# Patient Record
Sex: MALE | Race: WHITE | URBAN - METROPOLITAN AREA
[De-identification: names, ages, dates, MRNs, and addresses within clinical notes are randomized per-mention and may not be internally consistent; named-entity substitution may affect disease eponyms.]

---

## 2022-02-05 ENCOUNTER — HOSPITAL ENCOUNTER (EMERGENCY)
Age: 32
Discharge: HOME | End: 2022-02-05
Payer: COMMERCIAL

## 2022-02-05 VITALS
SYSTOLIC BLOOD PRESSURE: 175 MMHG | OXYGEN SATURATION: 97 % | HEART RATE: 71 BPM | HEIGHT: 76 IN | RESPIRATION RATE: 14 BRPM | BODY MASS INDEX: 34.7 KG/M2 | TEMPERATURE: 97.7 F | DIASTOLIC BLOOD PRESSURE: 88 MMHG | WEIGHT: 285 LBS

## 2022-02-05 DIAGNOSIS — S61.219A LACERATION OF MULTIPLE SITES OF RIGHT HAND AND FINGERS, INITIAL ENCOUNTER: Primary | ICD-10-CM

## 2022-02-05 DIAGNOSIS — S61.411A LACERATION OF MULTIPLE SITES OF RIGHT HAND AND FINGERS, INITIAL ENCOUNTER: Primary | ICD-10-CM

## 2022-02-05 PROCEDURE — 99283 EMERGENCY DEPT VISIT LOW MDM: CPT

## 2022-02-05 PROCEDURE — 90471 IMMUNIZATION ADMIN: CPT

## 2022-02-05 PROCEDURE — 12001 RPR S/N/AX/GEN/TRNK 2.5CM/<: CPT

## 2022-02-05 PROCEDURE — 90715 TDAP VACCINE 7 YRS/> IM: CPT

## 2022-02-05 PROCEDURE — 6360000002 HC RX W HCPCS

## 2022-02-05 RX ADMIN — TETANUS TOXOID, REDUCED DIPHTHERIA TOXOID AND ACELLULAR PERTUSSIS VACCINE, ADSORBED 0.5 ML: 5; 2.5; 8; 8; 2.5 SUSPENSION INTRAMUSCULAR at 14:54

## 2022-02-05 ASSESSMENT — PAIN DESCRIPTION - ORIENTATION: ORIENTATION: RIGHT

## 2022-02-05 ASSESSMENT — PAIN DESCRIPTION - LOCATION: LOCATION: FINGER (COMMENT WHICH ONE)

## 2022-02-05 ASSESSMENT — PAIN DESCRIPTION - PAIN TYPE: TYPE: ACUTE PAIN

## 2022-02-05 ASSESSMENT — PAIN SCALES - GENERAL: PAINLEVEL_OUTOF10: 3

## 2022-02-05 NOTE — ED NOTES
Patient requested dressing for stitched lacerations. Non adherent dressing placed on lacerations and secured with Kerlix.       Iza Paz RN  02/05/22 8245

## 2022-02-05 NOTE — ED PROVIDER NOTES
[facility]  ED  EMERGENCY DEPARTMENT ENCOUNTER        Pt Name: Jim Zuñiga  MRN: [id number]  Armstrongfjeffery 1990  Date of evaluation: 2/5/2022  Provider: ELLIE Juarez  PCP: JACKIE Price CNP    This patient was not seen and evaluated by the attending physician No att. providers found. I have evaluated this patient. My supervising physician was available for consultation. CHIEF COMPLAINT       Chief Complaint   Patient presents with    Hand Laceration     Pt reports punching his car's dashboard with R hand, cuts/ lacs noted to knuckles. Pt last tetanus 2015       HISTORY OF PRESENT ILLNESS   (Location/Symptom, Timing/Onset, Context/Setting, Quality, Duration, Modifying Factors, Severity)  Note limiting factors. Jim Zuñiga is a 36 y.o. male who presents via private vehicle from his home for evaluation of right hand laceration. Patient is right-hand dominant. He was sitting in traffic got very frustrated and he punched his dashboard, the plastic broke, lacerating his knuckles. He denies foreign body sensation. He endorses profuse but nonpulsatile bleeding. He denies any distal numbness tingling or weakness. He notes his last tetanus was in 2015. He has not taken any medicine for the pain. Nursing Notes were all reviewed and agreed with or any disagreements were addressed  in the HPI. Pt was seen during the [place] 19 pandemic. Appropriate PPE worn by ME during patient encounters. Pt seen during a time with constrained hospital bed capacity and other potential inpatient and outpatient resources were constrained due to the viral pandemic. REVIEW OF SYSTEMS    (2-9 systems for level 4, 10 or more for level 5)     Review of Systems    Positives and Pertinent negatives as per HPI. Except as noted abovein the ROS, all other systems were reviewed and negative.        PAST MEDICAL HISTORY     Past Medical History:   Diagnosis Date    Anxiety SURGICAL HISTORY     Past Surgical History:   Procedure Laterality Date    KNEE SURGERY Right     torn medial meniscus    TONSILLECTOMY AND ADENOIDECTOMY           CURRENTMEDICATIONS       Previous Medications    ESCITALOPRAM (LEXAPRO) 10 MG TABLET    Take 1 tablet by mouth daily. ALLERGIES     Patient has no known allergies. FAMILYHISTORY       Family History   Problem Relation Age of Onset    High Blood Pressure Other         moms side of family    High Blood Pressure Mother     High Blood Pressure Father           SOCIAL HISTORY       Social History     Socioeconomic History    Marital status: Single     Spouse name: None    Number of children: None    Years of education: None    Highest education level: None   Occupational History    None   Tobacco Use    Smoking status: Light Tobacco Smoker    Smokeless tobacco: Never Used    Tobacco comment: \"when i drink\"   Substance and Sexual Activity    Alcohol use: No    Drug use: Yes     Types: Marijuana Dolan Meckel)    Sexual activity: None   Other Topics Concern    None   Social History Narrative    None     Social Determinants of Health     Financial Resource Strain:     Difficulty of Paying Living Expenses: Not on file   Food Insecurity:     Worried About Running Out of Food in the Last Year: Not on file    Marylin of Food in the Last Year: Not on file   Transportation Needs:     Lack of Transportation (Medical): Not on file    Lack of Transportation (Non-Medical):  Not on file   Physical Activity:     Days of Exercise per Week: Not on file    Minutes of Exercise per Session: Not on file   Stress:     Feeling of Stress : Not on file   Social Connections:     Frequency of Communication with Friends and Family: Not on file    Frequency of Social Gatherings with Friends and Family: Not on file    Attends [employer] Services: Not on file    Active Member of Clubs or Organizations: Not on file    Attends Club or Organization Meetings: Not on file    Marital Status: Not on file   Intimate Partner Violence:     Fear of Current or Ex-Partner: Not on file    Emotionally Abused: Not on file    Physically Abused: Not on file    Sexually Abused: Not on file   Housing Stability:     Unable to Pay for Housing in the Last Year: Not on file    Number of [place] in the Last Year: Not on file    Unstable Housing in the Last Year: Not on file       SCREENINGS             PHYSICAL EXAM    (up to 7 for level 4, 8 or more for level 5)     ED Triage Vitals [02/05/22 1438]   BP Temp Temp Source Pulse Resp SpO2 Height Weight   (!) 175/88 97.7 °F (36.5 °C) Oral 71 14 97 % 6' 4\" (1.93 m) 285 lb (129.3 kg)       Physical Exam  PHYSICAL EXAM  BP (!) 175/88   Pulse 71   Temp 97.7 °F (36.5 °C) (Oral)   Resp 14   Ht 6' 4\" (1.93 m)   Wt 285 lb (129.3 kg)   SpO2 97%   BMI 34.69 kg/m²   GENERAL APPEARANCE: Awake and alert. 2Cooperative. Well-appearing adult male sitting upright in exam chair nondiaphoretic breathing comfortably on room air showing no sign of acute respiratory distress. HEAD: Normocephalic. Atraumatic. EYES: PERRL. EOM's grossly intact. ENT: Mucous membranes are moist.. NECK: Supple. HEART: RRR. No murmurs. Radial pulses 2+, symmetric. LUNGS: Respirations unlabored. CTAB. Good air exchange. Speaking comfortably in full sentences. EXTREMITIES: No peripheral edema. Moves all extremities equally. All extremities neurovascularly intact. Inspection of the right upper extremity reveals overall good bony alignment no gross deformity. There is mild soft tissue edema and swelling with horizontal lacerations over the third through fifth MCPs. No extensor or flexor strength abnormality. School intact range of motion, sensorimotor function C6-8 intact. Hemostasis achieved at initial time of evaluation. Mild venous ooze appreciated with irrigation and cleansing of the wound. Excellent distal cap refill.   Sensation to light touch intact. SKIN: Warm and dry. No acute rashes. NEUROLOGICAL: Alert and oriented. CN grossly intact. No gross facial drooping. Power intact to UE and LE, sensation intact x 4. No tremors or ataxia. Gait intact. PSYCHIATRIC: Normal mood and affect. DIAGNOSTIC RESULTS   LABS:    Labs Reviewed - No data to display    All other labs were within normal range or not returned as of this dictation. EKG: All EKG's are interpreted by the Emergency Department Physician who either signs orCo-signs this chart in the absence of a cardiologist.  Please see their note for interpretation of EKG. RADIOLOGY:   Non-plain film images such as CT, Ultrasound and MRI are read by the radiologist. Plain radiographic images are visualized andpreliminarily interpreted by the  ED Provider with the below findings:        Interpretation perthe Radiologist below, if available at the time of this note:    No orders to display     No results found. PROCEDURES   Unless otherwise noted below, none     Lac Repair  Performed by: ELLIE Alba  Authorized by: ELLIE Alba     Consent:     Consent obtained:  Verbal    Consent given by:  Patient    Risks discussed:  Pain, need for additional repair, poor cosmetic result and poor wound healing    Alternatives discussed:  No treatment  Anesthesia (see MAR for exact dosages):      Anesthesia method:  Local infiltration    Local anesthetic:  Lidocaine 1% w/o epi  Laceration details:     Location:  Hand    Hand location:  L hand, dorsum    Wound length (cm): three 1 cm lac   Repair type:     Repair type:  Simple  Pre-procedure details:     Preparation:  Patient was prepped and draped in usual sterile fashion  Exploration:     Hemostasis achieved with:  Direct pressure    Wound exploration: entire depth of wound probed and visualized      Wound extent: no fascia violation noted, no foreign bodies/material noted, no nerve damage noted and no tendon damage noted    Treatment: Area cleansed with:  Saline, Hibiclens and Betadine  Skin repair:     Repair method:  Sutures    Suture size:  5-0    Suture material:  Nylon    Suture technique:  Horizontal mattress    Number of sutures:  3  Approximation:     Approximation:  Close  Post-procedure details:     Dressing:  Non-adherent dressing    Patient tolerance of procedure: Tolerated well, no immediate complications        CRITICAL CARE TIME   N/A    CONSULTS:  None      EMERGENCY DEPARTMENT COURSE and DIFFERENTIALDIAGNOSIS/MDM:   Vitals:    Vitals:    02/05/22 1438   BP: (!) 175/88   Pulse: 71   Resp: 14   Temp: 97.7 °F (36.5 °C)   TempSrc: Oral   SpO2: 97%   Weight: 285 lb (129.3 kg)   Height: 6' 4\" (1.93 m)       Patient was given thefollowing medications:  Medications   Tetanus-Diphth-Acell Pertussis (BOOSTRIX) injection 0.5 mL (0.5 mLs IntraMUSCular Given 2/5/22 1454)       PDMP Monitoring:    Last PDMP Elías as Reviewed [facility]):  Review User Review Instant Review Result            Urine Drug Screenings (1 yr)    No resulted procedures found. Medication Contract and Consent for Opioid Use Documents Filed      No documents found                MDM:   Patient seen and evaluated. Old records reviewed. Diagnostic testing reviewed and results discussed. I have independently evaluated this patient based upon my scope of practice. Supervising physician was in the department for consultation as needed. 45-year-old male presents for evaluation of right hand laceration. No tendon involvement. He underwent laceration repair in the ER without acute complication. Tetanus updated. Patient will be discharged home with wound care instructions and strict ER return precautions. At this time I do not believe antibiotics warranted.   Based on patient's clinical history and clinical findings I currently estimate there is low risk for cellulitis, abscess, Ankita gangrene, necrotizing fasciitis, ulceration, compartment syndrome, tendon or NV injury or retained Fb. We have discussed the symptoms which are most concerning (e.g., changing or worsening pain, fever, numbness, weakness, cool or painful digits) that necessitate immediate return. Pt is in agreement with the current plan and all questions were addressed. Discharge Time out:  CC Reviewed Yes     Test Results Yes     Vitals:    02/05/22 1438   BP: (!) 175/88   Pulse: 71   Resp: 14   Temp: 97.7 °F (36.5 °C)   SpO2: 97%              FINAL IMPRESSION      1.  Laceration of multiple sites of right hand and fingers, initial encounter          DISPOSITION/PLAN   DISPOSITION Decision To Discharge 02/05/2022 03:19:21 PM      PATIENT REFERREDTO:  Valerie Rubio, APRN - CNP  [address] [place]  [phone]    Go to   If symptoms worsen    [facility]  ED  [facility] [address]  [phone]  Go to   For suture removal 10-12, sooner if no improvement or worsening of symptoms      DISCHARGE MEDICATIONS:  New Prescriptions    No medications on file       DISCONTINUED MEDICATIONS:  Discontinued Medications    No medications on file              (Please note that portions ofthis note were completed with a voice recognition program.  Efforts were made to edit the dictations but occasionally words are mis-transcribed.)    Bobby Marcos (electronically signed)       Bobby Marcos  02/13/22 1533

## 2022-02-05 NOTE — ED NOTES
Wounds cleansed with . 9NS solution and left open to air for further evaluation from PA.      Suzi Lauren RN  02/05/22 3170

## 2022-02-18 ENCOUNTER — HOSPITAL ENCOUNTER (EMERGENCY)
Age: 32
Discharge: HOME | End: 2022-02-18
Attending: EMERGENCY MEDICINE
Payer: COMMERCIAL

## 2022-02-18 VITALS
RESPIRATION RATE: 18 BRPM | OXYGEN SATURATION: 97 % | DIASTOLIC BLOOD PRESSURE: 67 MMHG | TEMPERATURE: 98.2 F | SYSTOLIC BLOOD PRESSURE: 104 MMHG | HEART RATE: 78 BPM

## 2022-02-18 DIAGNOSIS — Z48.02 VISIT FOR SUTURE REMOVAL: Primary | ICD-10-CM

## 2022-02-18 DIAGNOSIS — T14.8XXA WOUND INFECTION: ICD-10-CM

## 2022-02-18 DIAGNOSIS — L08.9 WOUND INFECTION: ICD-10-CM

## 2022-02-18 PROCEDURE — 99282 EMERGENCY DEPT VISIT SF MDM: CPT

## 2022-02-18 RX ORDER — CEPHALEXIN 500 MG/1
500 CAPSULE ORAL 2 TIMES DAILY
Qty: 14 CAPSULE | Refills: 0 | Status: SHIPPED | OUTPATIENT
Start: 2022-02-18 | End: 2022-02-25

## 2022-02-18 NOTE — ED PROVIDER NOTES
Emergency Department Provider Note  Location: [facility]  ED  2/18/2022     Patient Identification  Saul Sarmiento is a 36 y.o. male    Chief Complaint  Suture / Staple Removal (11 days pta sutures placed in right hand )          HPI  (History provided by patient)  Patient is a 62-year-old male who returns emergency department for suture removal.  He was seen in our emergency department 11 days ago after punching the inside of his car and having lacerations on his second third and fourth dorsal proximal digits. 3 horizontal mattress sutures were placed. Patient denies any complicating factors. I have reviewed the following nursing documentation:  Allergies: No Known Allergies    Past medical history:  has a past medical history of Anxiety. Past surgical history:  has a past surgical history that includes knee surgery (Right) and Tonsillectomy and adenoidectomy. Home medications:   Prior to Admission medications    Medication Sig Start Date End Date Taking? Authorizing Provider   cephALEXin (KEFLEX) 500 MG capsule Take 1 capsule by mouth 2 times daily for 7 days 2/18/22 2/25/22 Yes Basilia Severance, MD   escitalopram (LEXAPRO) 10 MG tablet Take 1 tablet by mouth daily. 4/20/15   Edita Harp, APRN - CNP       Social history:  reports that he has been smoking. He has never used smokeless tobacco. He reports current drug use. Drug: Marijuana Baldomero Meckel). He reports that he does not drink alcohol. Family history:    Family History   Problem Relation Age of Onset    High Blood Pressure Other         moms side of family    High Blood Pressure Mother     High Blood Pressure Father          ROS  Review of Systems   Constitutional: Negative for chills and fever. HENT: Negative for congestion and rhinorrhea. Eyes: Negative for photophobia and visual disturbance. Respiratory: Negative for cough, shortness of breath and wheezing.     Cardiovascular: Negative for chest pain and palpitations. Gastrointestinal: Negative for abdominal pain, diarrhea, nausea and vomiting. Genitourinary: Negative for dysuria and hematuria. Musculoskeletal: Negative for back pain and neck pain. Skin: Negative for rash and wound. Neurological: Negative for syncope and weakness. Psychiatric/Behavioral: Negative for agitation and confusion. Exam  ED Triage Vitals [02/18/22 1121]   BP Temp Temp Source Pulse Resp SpO2 Height Weight   104/67 98.2 °F (36.8 °C) Oral 78 18 97 % -- --       Physical Exam  Vitals and nursing note reviewed. Constitutional:       General: He is not in acute distress. Appearance: He is well-developed. HENT:      Head: Normocephalic and atraumatic. Nose: Nose normal. No congestion. Eyes:      Extraocular Movements: Extraocular movements intact. Pupils: Pupils are equal, round, and reactive to light. Cardiovascular:      Rate and Rhythm: Normal rate and regular rhythm. Heart sounds: No murmur heard. Pulmonary:      Effort: Pulmonary effort is normal.      Breath sounds: Normal breath sounds. Abdominal:      General: There is no distension. Palpations: Abdomen is soft. Tenderness: There is no abdominal tenderness. Musculoskeletal:         General: No deformity. Normal range of motion. Cervical back: Normal range of motion and neck supple. Skin:     General: Skin is warm. Findings: No rash. Comments: 3 horizontal mattress sutures were removed. On the third digit there is a small amount of purulent material that weeps out. There is no tenderness or fluctuance. There is mild erythema over the area. Does not cross the joint line   Neurological:      Mental Status: He is alert and oriented to person, place, and time. Motor: No abnormal muscle tone.       Coordination: Coordination normal.   Psychiatric:         Mood and Affect: Mood normal.         Behavior: Behavior normal.           ED Course    ED Medication Orders (From admission, onward)    None              Radiology  No results found. Labs  No results found for this visit on 02/18/22. [facility]  Patient seen and evaluated. Relevant records reviewed. 62-year-old male presents for suture removal.  Well-appearing on exam reassuring vitals. 3 horizontal mattress sutures were removed. There is questionable purulent cellulitis of the third digit as there is ?purulent material weeping from the suture removal area. There is mild cellulitis over this area I am covering him with antibiotic. No history of MRSA. I discussed appropriate return precautions and follow-up. Clinical Impression:  1. Visit for suture removal    2. Wound infection          Disposition:  Discharge to home in good condition. Blood pressure 104/67, pulse 78, temperature 98.2 °F (36.8 °C), temperature source Oral, resp. rate 18, SpO2 97 %. Patient was given scripts for the following medications. I counseled patient how to take these medications. Discharge Medication List as of 2/18/2022 11:39 AM      START taking these medications    Details   cephALEXin (KEFLEX) 500 MG capsule Take 1 capsule by mouth 2 times daily for 7 days, Disp-14 capsule, R-0Normal             Disposition referral (if applicable):  Teresita Taylor, APRN - CNP  [address] [place]  [phone]    Schedule an appointment as soon as possible for a visit in 1 week          Total critical care time is 0 minutes, which excludes separately billable procedures and updating family. Time spent is specifically for management of the presenting complaint and symptoms initially, direct bedside care, reevaluation, review of records, and consultation. There was a high probability of clinically significant life-threatening deterioration in the patient's condition, which required my urgent intervention.      This chart was generated in part by using Dragon Dictation system and may contain errors related to that system including errors in grammar, punctuation, and spelling, as well as words and phrases that may be inappropriate. If there are any questions or concerns please feel free to contact the dictating provider for clarification.      Erick Hemphill MD  [address] MD  02/19/22 9942

## 2022-02-19 ASSESSMENT — ENCOUNTER SYMPTOMS
COUGH: 0
RHINORRHEA: 0
PHOTOPHOBIA: 0
NAUSEA: 0
ABDOMINAL PAIN: 0
SHORTNESS OF BREATH: 0
BACK PAIN: 0
VOMITING: 0
DIARRHEA: 0
WHEEZING: 0